# Patient Record
Sex: FEMALE | Race: WHITE | NOT HISPANIC OR LATINO
[De-identification: names, ages, dates, MRNs, and addresses within clinical notes are randomized per-mention and may not be internally consistent; named-entity substitution may affect disease eponyms.]

---

## 2017-11-30 ENCOUNTER — RX ONLY (RX ONLY)
Age: 47
End: 2017-11-30

## 2017-11-30 ENCOUNTER — DOCTOR'S OFFICE (OUTPATIENT)
Dept: URBAN - NONMETROPOLITAN AREA CLINIC 1 | Facility: CLINIC | Age: 47
Setting detail: OPHTHALMOLOGY
End: 2017-11-30
Payer: COMMERCIAL

## 2017-11-30 ENCOUNTER — OPTICAL OFFICE (OUTPATIENT)
Dept: URBAN - NONMETROPOLITAN AREA CLINIC 4 | Facility: CLINIC | Age: 47
Setting detail: OPHTHALMOLOGY
End: 2017-11-30
Payer: COMMERCIAL

## 2017-11-30 DIAGNOSIS — H52.03: ICD-10-CM

## 2017-11-30 DIAGNOSIS — H52.4: ICD-10-CM

## 2017-11-30 PROCEDURE — 92004 COMPRE OPH EXAM NEW PT 1/>: CPT | Performed by: OPTOMETRIST

## 2017-11-30 PROCEDURE — V2784 LENS POLYCARB OR EQUAL: HCPCS | Performed by: OPTOMETRIST

## 2017-11-30 PROCEDURE — 92015 DETERMINE REFRACTIVE STATE: CPT | Performed by: OPTOMETRIST

## 2017-11-30 PROCEDURE — V2202 LENS SPHERE BIFOCAL 7.12-20.: HCPCS | Performed by: OPTOMETRIST

## 2017-11-30 PROCEDURE — V2020 VISION SVCS FRAMES PURCHASES: HCPCS | Performed by: OPTOMETRIST

## 2017-11-30 ASSESSMENT — REFRACTION_OUTSIDERX
OS_ADD: +1.50
OS_VA2: 20/20
OD_SPHERE: +0.50
OD_AXIS: 150
OS_VA1: 20/20
OD_CYLINDER: -0.50
OS_VA3: 20/
OS_SPHERE: +0.50
OD_VA1: 20/20
OD_VA3: 20/
OD_VA2: 20/25
OU_VA: 20/20
OD_ADD: +1.50

## 2017-11-30 ASSESSMENT — REFRACTION_MANIFEST
OD_VA3: 20/
OD_VA3: 20/
OU_VA: 20/
OD_VA2: 20/
OD_VA1: 20/
OS_VA3: 20/
OD_VA1: 20/
OS_VA2: 20/
OS_VA3: 20/
OD_VA2: 20/
OS_VA2: 20/
OS_VA1: 20/
OS_VA1: 20/
OU_VA: 20/

## 2017-11-30 ASSESSMENT — REFRACTION_CURRENTRX
OS_OVR_VA: 20/
OD_OVR_VA: 20/
OD_CYLINDER: -0.25
OD_VPRISM_DIRECTION: BF
OS_ADD: +1.75
OS_SPHERE: PL
OD_AXIS: 008
OS_OVR_VA: 20/
OD_OVR_VA: 20/
OD_ADD: +1.75
OS_OVR_VA: 20/
OD_OVR_VA: 20/
OS_VPRISM_DIRECTION: BF
OD_SPHERE: PL

## 2017-11-30 ASSESSMENT — REFRACTION_AUTOREFRACTION
OS_AXIS: 004
OS_SPHERE: +1.00
OD_AXIS: 151
OS_CYLINDER: -0.75
OD_SPHERE: +0.75
OD_CYLINDER: -0.50

## 2017-11-30 ASSESSMENT — VISUAL ACUITY
OD_BCVA: 20/25-1
OS_BCVA: 20/25-2

## 2017-11-30 ASSESSMENT — SPHEQUIV_DERIVED
OD_SPHEQUIV: 0.5
OS_SPHEQUIV: 0.625

## 2017-11-30 ASSESSMENT — CONFRONTATIONAL VISUAL FIELD TEST (CVF)
OS_FINDINGS: FULL
OD_FINDINGS: FULL

## 2018-03-13 ENCOUNTER — OPTICAL OFFICE (OUTPATIENT)
Dept: URBAN - NONMETROPOLITAN AREA CLINIC 4 | Facility: CLINIC | Age: 48
Setting detail: OPHTHALMOLOGY
End: 2018-03-13
Payer: COMMERCIAL

## 2018-03-13 DIAGNOSIS — H52.03: ICD-10-CM

## 2018-03-13 PROCEDURE — V2784 LENS POLYCARB OR EQUAL: HCPCS | Performed by: OPTOMETRIST

## 2018-03-13 PROCEDURE — V2202 LENS SPHERE BIFOCAL 7.12-20.: HCPCS | Performed by: OPTOMETRIST

## 2018-03-13 PROCEDURE — V2020 VISION SVCS FRAMES PURCHASES: HCPCS | Performed by: OPTOMETRIST

## 2018-07-27 ENCOUNTER — OPTICAL OFFICE (OUTPATIENT)
Dept: URBAN - NONMETROPOLITAN AREA CLINIC 4 | Facility: CLINIC | Age: 48
Setting detail: OPHTHALMOLOGY
End: 2018-07-27
Payer: COMMERCIAL

## 2018-07-27 DIAGNOSIS — H52.221: ICD-10-CM

## 2018-07-27 PROCEDURE — V2784 LENS POLYCARB OR EQUAL: HCPCS | Performed by: OPTOMETRIST

## 2018-07-27 PROCEDURE — V2200 LENS SPHER BIFOC PLANO 4.00D: HCPCS | Performed by: OPTOMETRIST

## 2018-07-27 PROCEDURE — V2020 VISION SVCS FRAMES PURCHASES: HCPCS | Performed by: OPTOMETRIST

## 2018-07-27 PROCEDURE — V2202 LENS SPHERE BIFOCAL 7.12-20.: HCPCS | Performed by: OPTOMETRIST

## 2019-06-06 ENCOUNTER — DOCTOR'S OFFICE (OUTPATIENT)
Dept: URBAN - NONMETROPOLITAN AREA CLINIC 1 | Facility: CLINIC | Age: 49
Setting detail: OPHTHALMOLOGY
End: 2019-06-06
Payer: COMMERCIAL

## 2019-06-06 ENCOUNTER — OPTICAL OFFICE (OUTPATIENT)
Dept: URBAN - NONMETROPOLITAN AREA CLINIC 4 | Facility: CLINIC | Age: 49
Setting detail: OPHTHALMOLOGY
End: 2019-06-06
Payer: COMMERCIAL

## 2019-06-06 DIAGNOSIS — H52.03: ICD-10-CM

## 2019-06-06 DIAGNOSIS — H52.223: ICD-10-CM

## 2019-06-06 PROCEDURE — V2202 LENS SPHERE BIFOCAL 7.12-20.: HCPCS | Performed by: OPTOMETRIST

## 2019-06-06 PROCEDURE — V2784 LENS POLYCARB OR EQUAL: HCPCS | Performed by: OPTOMETRIST

## 2019-06-06 PROCEDURE — 92014 COMPRE OPH EXAM EST PT 1/>: CPT | Performed by: OPTOMETRIST

## 2019-06-06 PROCEDURE — V2203 LENS SPHCYL BIFOCAL 4.00D/.1: HCPCS | Performed by: OPTOMETRIST

## 2019-06-06 PROCEDURE — V2020 VISION SVCS FRAMES PURCHASES: HCPCS | Performed by: OPTOMETRIST

## 2019-06-06 ASSESSMENT — REFRACTION_MANIFEST
OS_ADD: +1.50
OU_VA: 20/20
OD_VA2: 20/
OD_VA1: 20/20
OU_VA: 20/
OS_VA1: 20/20
OD_SPHERE: +0.50
OS_VA3: 20/
OS_VA2: 20/20
OD_VA1: 20/20
OD_CYLINDER: -0.75
OS_VA2: 20/
OD_VA3: 20/
OD_AXIS: 170
OS_SPHERE: +0.50
OD_VA3: 20/
OD_CYLINDER: -0.50
OS_VA1: 20/20
OS_VA3: 20/
OD_ADD: +2.00
OS_AXIS: 15
OD_ADD: +1.50
OD_VA2: 20/25
OS_SPHERE: +0.75
OS_CYLINDER: -0.25
OD_AXIS: 150
OD_SPHERE: +1.00
OS_ADD: +2.00

## 2019-06-06 ASSESSMENT — SPHEQUIV_DERIVED
OD_SPHEQUIV: 0.25
OD_SPHEQUIV: 0.625
OS_SPHEQUIV: 0.75
OS_SPHEQUIV: 0.625
OD_SPHEQUIV: 0.625

## 2019-06-06 ASSESSMENT — REFRACTION_CURRENTRX
OD_OVR_VA: 20/
OS_AXIS: 180
OD_ADD: +1.75
OD_OVR_VA: 20/
OD_SPHERE: +0.50
OS_OVR_VA: 20/
OD_VPRISM_DIRECTION: BF
OS_CYLINDER: 0.00
OD_CYLINDER: -0.50
OD_AXIS: 151
OS_OVR_VA: 20/
OS_VPRISM_DIRECTION: BF
OD_OVR_VA: 20/
OS_OVR_VA: 20/
OS_SPHERE: +0.50
OS_ADD: +1.75

## 2019-06-06 ASSESSMENT — REFRACTION_AUTOREFRACTION
OD_AXIS: 149
OD_CYLINDER: -0.75
OD_SPHERE: +1.00
OS_CYLINDER: -0.50
OS_SPHERE: +1.00
OS_AXIS: 180

## 2019-06-06 ASSESSMENT — CONFRONTATIONAL VISUAL FIELD TEST (CVF)
OD_FINDINGS: FULL
OS_FINDINGS: FULL

## 2019-06-06 ASSESSMENT — VISUAL ACUITY
OS_BCVA: 20/25-1
OD_BCVA: 20/25

## 2019-10-21 ENCOUNTER — OPTICAL OFFICE (OUTPATIENT)
Dept: URBAN - NONMETROPOLITAN AREA CLINIC 4 | Facility: CLINIC | Age: 49
Setting detail: OPHTHALMOLOGY
End: 2019-10-21
Payer: COMMERCIAL

## 2019-10-21 DIAGNOSIS — H52.223: ICD-10-CM

## 2019-10-21 PROCEDURE — V2020 VISION SVCS FRAMES PURCHASES: HCPCS | Performed by: OPTOMETRIST

## 2019-10-21 PROCEDURE — V2784 LENS POLYCARB OR EQUAL: HCPCS | Performed by: OPTOMETRIST

## 2019-10-21 PROCEDURE — V2203 LENS SPHCYL BIFOCAL 4.00D/.1: HCPCS | Performed by: OPTOMETRIST

## 2019-10-21 PROCEDURE — V2202 LENS SPHERE BIFOCAL 7.12-20.: HCPCS | Performed by: OPTOMETRIST

## 2020-08-12 ENCOUNTER — OPTICAL OFFICE (OUTPATIENT)
Dept: URBAN - NONMETROPOLITAN AREA CLINIC 4 | Facility: CLINIC | Age: 50
Setting detail: OPHTHALMOLOGY
End: 2020-08-12
Payer: COMMERCIAL

## 2020-08-12 ENCOUNTER — DOCTOR'S OFFICE (OUTPATIENT)
Dept: URBAN - NONMETROPOLITAN AREA CLINIC 1 | Facility: CLINIC | Age: 50
Setting detail: OPHTHALMOLOGY
End: 2020-08-12
Payer: COMMERCIAL

## 2020-08-12 VITALS — HEIGHT: 55 IN

## 2020-08-12 DIAGNOSIS — H52.4: ICD-10-CM

## 2020-08-12 DIAGNOSIS — H52.03: ICD-10-CM

## 2020-08-12 PROCEDURE — V2020 VISION SVCS FRAMES PURCHASES: HCPCS | Performed by: OPTOMETRIST

## 2020-08-12 PROCEDURE — V2202 LENS SPHERE BIFOCAL 7.12-20.: HCPCS | Performed by: OPTOMETRIST

## 2020-08-12 PROCEDURE — V2200 LENS SPHER BIFOC PLANO 4.00D: HCPCS | Performed by: OPTOMETRIST

## 2020-08-12 PROCEDURE — 92015 DETERMINE REFRACTIVE STATE: CPT | Performed by: OPTOMETRIST

## 2020-08-12 PROCEDURE — 92014 COMPRE OPH EXAM EST PT 1/>: CPT | Performed by: OPTOMETRIST

## 2020-08-12 PROCEDURE — V2784 LENS POLYCARB OR EQUAL: HCPCS | Performed by: OPTOMETRIST

## 2020-08-12 ASSESSMENT — SPHEQUIV_DERIVED
OS_SPHEQUIV: 1
OD_SPHEQUIV: 0.5
OD_SPHEQUIV: 0.625
OS_SPHEQUIV: 0.625
OD_SPHEQUIV: 0.625

## 2020-08-12 ASSESSMENT — REFRACTION_CURRENTRX
OS_SPHERE: +0.75
OS_VPRISM_DIRECTION: BF
OD_SPHERE: +1.00
OD_ADD: +2.25
OS_CYLINDER: -0.25
OS_AXIS: 8
OS_ADD: +2.25
OD_CYLINDER: -0.75
OD_OVR_VA: 20/
OS_OVR_VA: 20/
OD_VPRISM_DIRECTION: BF
OD_AXIS: 174

## 2020-08-12 ASSESSMENT — REFRACTION_AUTOREFRACTION
OS_SPHERE: +1.00
OD_AXIS: 169
OD_CYLINDER: -0.25
OS_AXIS: 180
OS_CYLINDER: 0.00
OD_SPHERE: +0.75

## 2020-08-12 ASSESSMENT — REFRACTION_MANIFEST
OD_VA2: 20/20
OD_VA1: 20/20
OD_VA1: 20/20
OS_SPHERE: +0.75
OU_VA: 20/20
OS_CYLINDER: -0.25
OD_SPHERE: +0.75
OS_VA2: 20/20
OS_VA1: 20/20
OD_SPHERE: +1.00
OD_AXIS: 170
OS_AXIS: 15
OD_AXIS: 170
OD_ADD: +2.00
OS_VA1: 20/20
OS_CYLINDER: SPH
OS_ADD: +2.00
OS_ADD: +2.00
OD_CYLINDER: -0.75
OD_CYLINDER: -0.50
OD_ADD: +2.00
OS_SPHERE: +0.75

## 2020-08-12 ASSESSMENT — CONFRONTATIONAL VISUAL FIELD TEST (CVF)
OS_FINDINGS: FULL
OD_FINDINGS: FULL

## 2020-08-12 ASSESSMENT — VISUAL ACUITY
OD_BCVA: 20/20-2
OS_BCVA: 20/20-1

## 2020-08-26 ENCOUNTER — OPTICAL OFFICE (OUTPATIENT)
Dept: URBAN - NONMETROPOLITAN AREA CLINIC 4 | Facility: CLINIC | Age: 50
Setting detail: OPHTHALMOLOGY
End: 2020-08-26
Payer: COMMERCIAL

## 2020-08-26 DIAGNOSIS — H52.03: ICD-10-CM

## 2020-08-26 PROCEDURE — V2020 VISION SVCS FRAMES PURCHASES: HCPCS | Performed by: OPTOMETRIST

## 2020-08-26 PROCEDURE — V2200 LENS SPHER BIFOC PLANO 4.00D: HCPCS | Performed by: OPTOMETRIST

## 2020-08-26 PROCEDURE — V2202 LENS SPHERE BIFOCAL 7.12-20.: HCPCS | Performed by: OPTOMETRIST

## 2020-08-26 PROCEDURE — V2784 LENS POLYCARB OR EQUAL: HCPCS | Performed by: OPTOMETRIST

## 2020-10-21 ENCOUNTER — HOSPITAL ENCOUNTER (OUTPATIENT)
Dept: RADIOLOGY | Facility: HOSPITAL | Age: 50
Discharge: HOME | End: 2020-10-21
Attending: RADIOLOGY

## 2020-10-21 DIAGNOSIS — Z76.89 REFERRAL OF PATIENT WITHOUT EXAMINATION OR TREATMENT: ICD-10-CM

## 2020-10-22 ENCOUNTER — CONSULT (OUTPATIENT)
Dept: PAIN MEDICINE | Facility: CLINIC | Age: 50
End: 2020-10-22
Payer: COMMERCIAL

## 2020-10-22 VITALS
DIASTOLIC BLOOD PRESSURE: 80 MMHG | BODY MASS INDEX: 35.99 KG/M2 | WEIGHT: 216 LBS | TEMPERATURE: 97.7 F | SYSTOLIC BLOOD PRESSURE: 128 MMHG | HEART RATE: 109 BPM | HEIGHT: 65 IN

## 2020-10-22 DIAGNOSIS — M54.16 LUMBAR RADICULOPATHY: Primary | ICD-10-CM

## 2020-10-22 DIAGNOSIS — M53.3 SACROILIAC JOINT DYSFUNCTION OF RIGHT SIDE: ICD-10-CM

## 2020-10-22 PROCEDURE — 99244 OFF/OP CNSLTJ NEW/EST MOD 40: CPT | Performed by: ANESTHESIOLOGY

## 2020-10-22 RX ORDER — CYCLOBENZAPRINE HCL 10 MG
TABLET ORAL
COMMUNITY
Start: 2020-10-05 | End: 2020-10-22

## 2020-10-22 RX ORDER — CELECOXIB 200 MG/1
200 CAPSULE ORAL 2 TIMES DAILY
Qty: 60 CAPSULE | Refills: 0 | Status: SHIPPED | OUTPATIENT
Start: 2020-10-22

## 2020-10-22 RX ORDER — IBUPROFEN 800 MG/1
TABLET ORAL
COMMUNITY
Start: 2020-10-05 | End: 2020-10-22

## 2020-10-22 RX ORDER — GABAPENTIN 100 MG/1
100 CAPSULE ORAL 3 TIMES DAILY
Qty: 90 CAPSULE | Refills: 0 | Status: SHIPPED | OUTPATIENT
Start: 2020-10-22

## 2020-10-29 ENCOUNTER — EVALUATION (OUTPATIENT)
Dept: PHYSICAL THERAPY | Facility: CLINIC | Age: 50
End: 2020-10-29
Payer: COMMERCIAL

## 2020-10-29 DIAGNOSIS — M54.16 LUMBAR RADICULOPATHY: ICD-10-CM

## 2020-10-29 PROCEDURE — 97162 PT EVAL MOD COMPLEX 30 MIN: CPT | Performed by: PHYSICAL THERAPIST

## 2020-10-29 PROCEDURE — 97535 SELF CARE MNGMENT TRAINING: CPT | Performed by: PHYSICAL THERAPIST

## 2020-11-05 ENCOUNTER — OFFICE VISIT (OUTPATIENT)
Dept: PHYSICAL THERAPY | Facility: CLINIC | Age: 50
End: 2020-11-05
Payer: COMMERCIAL

## 2020-11-05 DIAGNOSIS — M54.16 LUMBAR RADICULOPATHY: Primary | ICD-10-CM

## 2020-11-05 PROCEDURE — 97140 MANUAL THERAPY 1/> REGIONS: CPT | Performed by: PHYSICAL THERAPIST

## 2020-11-05 PROCEDURE — 97112 NEUROMUSCULAR REEDUCATION: CPT | Performed by: PHYSICAL THERAPIST

## 2020-11-13 ENCOUNTER — OFFICE VISIT (OUTPATIENT)
Dept: PHYSICAL THERAPY | Facility: CLINIC | Age: 50
End: 2020-11-13
Payer: COMMERCIAL

## 2020-11-13 DIAGNOSIS — M54.16 LUMBAR RADICULOPATHY: Primary | ICD-10-CM

## 2020-11-13 PROCEDURE — 97140 MANUAL THERAPY 1/> REGIONS: CPT | Performed by: PHYSICAL THERAPIST

## 2020-11-13 PROCEDURE — 97112 NEUROMUSCULAR REEDUCATION: CPT | Performed by: PHYSICAL THERAPIST

## 2021-01-20 ENCOUNTER — APPOINTMENT (EMERGENCY)
Dept: CT IMAGING | Facility: HOSPITAL | Age: 51
End: 2021-01-20
Payer: COMMERCIAL

## 2021-01-20 ENCOUNTER — APPOINTMENT (EMERGENCY)
Dept: RADIOLOGY | Facility: HOSPITAL | Age: 51
End: 2021-01-20
Payer: COMMERCIAL

## 2021-01-20 ENCOUNTER — HOSPITAL ENCOUNTER (EMERGENCY)
Facility: HOSPITAL | Age: 51
Discharge: HOME | End: 2021-01-21
Attending: EMERGENCY MEDICINE | Admitting: EMERGENCY MEDICINE
Payer: COMMERCIAL

## 2021-01-20 DIAGNOSIS — R42 DIZZINESS: Primary | ICD-10-CM

## 2021-01-20 LAB
ANION GAP SERPL CALCULATED.3IONS-SCNC: 12 MMOL/L (ref 4–13)
BASOPHILS # BLD AUTO: 0.1 THOUSANDS/ΜL (ref 0–0.1)
BASOPHILS NFR BLD AUTO: 1 % (ref 0–1)
BUN SERPL-MCNC: 21 MG/DL (ref 5–25)
CALCIUM SERPL-MCNC: 9.7 MG/DL (ref 8.3–10.1)
CHLORIDE SERPL-SCNC: 101 MMOL/L (ref 100–108)
CO2 SERPL-SCNC: 26 MMOL/L (ref 21–32)
CREAT SERPL-MCNC: 0.83 MG/DL (ref 0.6–1.3)
D DIMER PPP FEU-MCNC: 0.56 UG/ML FEU
EOSINOPHIL # BLD AUTO: 0.21 THOUSAND/ΜL (ref 0–0.61)
EOSINOPHIL NFR BLD AUTO: 2 % (ref 0–6)
ERYTHROCYTE [DISTWIDTH] IN BLOOD BY AUTOMATED COUNT: 17.2 % (ref 11.6–15.1)
GFR SERPL CREATININE-BSD FRML MDRD: 84 ML/MIN/1.73SQ M
GLUCOSE SERPL-MCNC: 125 MG/DL (ref 65–140)
HCT VFR BLD AUTO: 43.5 % (ref 34.8–46.1)
HGB BLD-MCNC: 13.9 G/DL (ref 11.5–15.4)
IMM GRANULOCYTES # BLD AUTO: 0.04 THOUSAND/UL (ref 0–0.2)
IMM GRANULOCYTES NFR BLD AUTO: 0 % (ref 0–2)
LYMPHOCYTES # BLD AUTO: 4.86 THOUSANDS/ΜL (ref 0.6–4.47)
LYMPHOCYTES NFR BLD AUTO: 38 % (ref 14–44)
MCH RBC QN AUTO: 25.2 PG (ref 26.8–34.3)
MCHC RBC AUTO-ENTMCNC: 32 G/DL (ref 31.4–37.4)
MCV RBC AUTO: 79 FL (ref 82–98)
MONOCYTES # BLD AUTO: 0.91 THOUSAND/ΜL (ref 0.17–1.22)
MONOCYTES NFR BLD AUTO: 7 % (ref 4–12)
NEUTROPHILS # BLD AUTO: 6.74 THOUSANDS/ΜL (ref 1.85–7.62)
NEUTS SEG NFR BLD AUTO: 52 % (ref 43–75)
NRBC BLD AUTO-RTO: 0 /100 WBCS
NT-PROBNP SERPL-MCNC: 6 PG/ML
PLATELET # BLD AUTO: 369 THOUSANDS/UL (ref 149–390)
PMV BLD AUTO: 9.2 FL (ref 8.9–12.7)
POTASSIUM SERPL-SCNC: 3.7 MMOL/L (ref 3.5–5.3)
RBC # BLD AUTO: 5.51 MILLION/UL (ref 3.81–5.12)
SODIUM SERPL-SCNC: 139 MMOL/L (ref 136–145)
TROPONIN I SERPL-MCNC: <0.02 NG/ML
WBC # BLD AUTO: 12.86 THOUSAND/UL (ref 4.31–10.16)

## 2021-01-20 PROCEDURE — 96374 THER/PROPH/DIAG INJ IV PUSH: CPT

## 2021-01-20 PROCEDURE — G1004 CDSM NDSC: HCPCS

## 2021-01-20 PROCEDURE — 84484 ASSAY OF TROPONIN QUANT: CPT | Performed by: EMERGENCY MEDICINE

## 2021-01-20 PROCEDURE — 80048 BASIC METABOLIC PNL TOTAL CA: CPT | Performed by: EMERGENCY MEDICINE

## 2021-01-20 PROCEDURE — 71275 CT ANGIOGRAPHY CHEST: CPT

## 2021-01-20 PROCEDURE — 85025 COMPLETE CBC W/AUTO DIFF WBC: CPT | Performed by: EMERGENCY MEDICINE

## 2021-01-20 PROCEDURE — 99285 EMERGENCY DEPT VISIT HI MDM: CPT

## 2021-01-20 PROCEDURE — 96361 HYDRATE IV INFUSION ADD-ON: CPT

## 2021-01-20 PROCEDURE — 70450 CT HEAD/BRAIN W/O DYE: CPT

## 2021-01-20 PROCEDURE — 93005 ELECTROCARDIOGRAM TRACING: CPT

## 2021-01-20 PROCEDURE — 71045 X-RAY EXAM CHEST 1 VIEW: CPT

## 2021-01-20 PROCEDURE — 81003 URINALYSIS AUTO W/O SCOPE: CPT | Performed by: EMERGENCY MEDICINE

## 2021-01-20 PROCEDURE — 36415 COLL VENOUS BLD VENIPUNCTURE: CPT | Performed by: EMERGENCY MEDICINE

## 2021-01-20 PROCEDURE — 83880 ASSAY OF NATRIURETIC PEPTIDE: CPT | Performed by: EMERGENCY MEDICINE

## 2021-01-20 PROCEDURE — 85379 FIBRIN DEGRADATION QUANT: CPT | Performed by: EMERGENCY MEDICINE

## 2021-01-20 RX ORDER — SODIUM CHLORIDE 9 MG/ML
3 INJECTION INTRAVENOUS
Status: DISCONTINUED | OUTPATIENT
Start: 2021-01-20 | End: 2021-01-21 | Stop reason: HOSPADM

## 2021-01-20 RX ORDER — BUSPIRONE HYDROCHLORIDE 10 MG/1
10 TABLET ORAL 2 TIMES DAILY
COMMUNITY

## 2021-01-20 RX ORDER — DIAZEPAM 5 MG/ML
5 INJECTION, SOLUTION INTRAMUSCULAR; INTRAVENOUS ONCE
Status: DISCONTINUED | OUTPATIENT
Start: 2021-01-20 | End: 2021-01-20

## 2021-01-20 RX ORDER — LORAZEPAM 2 MG/ML
1 INJECTION INTRAMUSCULAR ONCE
Status: COMPLETED | OUTPATIENT
Start: 2021-01-20 | End: 2021-01-20

## 2021-01-20 RX ADMIN — IOHEXOL 85 ML: 350 INJECTION, SOLUTION INTRAVENOUS at 23:45

## 2021-01-20 RX ADMIN — SODIUM CHLORIDE 1000 ML: 0.9 INJECTION, SOLUTION INTRAVENOUS at 23:15

## 2021-01-20 RX ADMIN — LORAZEPAM 1 MG: 2 INJECTION INTRAMUSCULAR; INTRAVENOUS at 23:15

## 2021-01-21 VITALS
SYSTOLIC BLOOD PRESSURE: 138 MMHG | OXYGEN SATURATION: 97 % | WEIGHT: 222.66 LBS | RESPIRATION RATE: 18 BRPM | HEART RATE: 93 BPM | BODY MASS INDEX: 37.05 KG/M2 | TEMPERATURE: 96.6 F | DIASTOLIC BLOOD PRESSURE: 87 MMHG

## 2021-01-21 LAB
BILIRUB UR QL STRIP: NEGATIVE
CLARITY UR: CLEAR
COLOR UR: YELLOW
GLUCOSE UR STRIP-MCNC: NEGATIVE MG/DL
HGB UR QL STRIP.AUTO: NEGATIVE
KETONES UR STRIP-MCNC: NEGATIVE MG/DL
LEUKOCYTE ESTERASE UR QL STRIP: NEGATIVE
NITRITE UR QL STRIP: NEGATIVE
PH UR STRIP.AUTO: 6.5 [PH]
PROT UR STRIP-MCNC: NEGATIVE MG/DL
SP GR UR STRIP.AUTO: 1.02 (ref 1–1.03)
UROBILINOGEN UR QL STRIP.AUTO: 0.2 E.U./DL

## 2021-01-21 PROCEDURE — 99284 EMERGENCY DEPT VISIT MOD MDM: CPT | Performed by: EMERGENCY MEDICINE

## 2021-01-21 NOTE — ED PROVIDER NOTES
History  Chief Complaint   Patient presents with    Dizziness     Patient started feeling dizzy and lightheaded since this morning  Patient recently started taking a new medication  65-year-old female with recent positive for COVID presents ED with lightheadedness x1 day  Dizziness  Quality:  Lightheadedness  Severity:  Mild  Onset quality:  Sudden  Timing:  Intermittent  Progression:  Worsening  Chronicity:  New  Context: medication    Relieved by:  None tried  Worsened by:  Nothing  Ineffective treatments:  None tried  Associated symptoms: palpitations and shortness of breath        Prior to Admission Medications   Prescriptions Last Dose Informant Patient Reported? Taking?   busPIRone (BUSPAR) 10 mg tablet 1/20/2021 at Unknown time  Yes Yes   Sig: Take 10 mg by mouth 2 (two) times a day   celecoxib (CeleBREX) 200 mg capsule Not Taking at Unknown time  No No   Sig: Take 1 capsule (200 mg total) by mouth 2 (two) times a day   Patient not taking: Reported on 1/20/2021   gabapentin (NEURONTIN) 100 mg capsule More than a month at Unknown time  No No   Sig: Take 1 capsule (100 mg total) by mouth 3 (three) times a day      Facility-Administered Medications: None       Past Medical History:   Diagnosis Date    Arthritis     Asthma     Depression     Thyroid disease     Uterine cancer ([facility] [address] )        Past Surgical History:   Procedure Laterality Date    HYSTERECTOMY  09/2019    KNEE SURGERY         Family History   Problem Relation Age of Onset    Lung cancer Mother     Heart attack Father     Diabetes Father     Diabetes Maternal Grandmother      I have reviewed and agree with the history as documented      E-Cigarette/Vaping    E-Cigarette Use Never User      E-Cigarette/Vaping Substances     Social History     Tobacco Use    Smoking status: Never Smoker    Smokeless tobacco: Never Used   Substance Use Topics    Alcohol use: Never     Frequency: Never    Drug use: Never       Review of Systems Constitutional: Negative for fatigue  Respiratory: Positive for shortness of breath  Cardiovascular: Positive for palpitations  Gastrointestinal: Negative for abdominal pain  Neurological: Positive for dizziness  All other systems reviewed and are negative  Physical Exam  Physical Exam  Vitals signs and nursing note reviewed  Constitutional:       General: She is not in acute distress  Appearance: She is well-developed  HENT:      Head: Normocephalic and atraumatic  Right Ear: External ear normal       Left Ear: External ear normal    Eyes:      Conjunctiva/sclera: Conjunctivae normal       Pupils: Pupils are equal, round, and reactive to light  Neck:      Musculoskeletal: Normal range of motion and neck supple  Cardiovascular:      Rate and Rhythm: Normal rate and regular rhythm  Heart sounds: Normal heart sounds  No murmur  Pulmonary:      Effort: Pulmonary effort is normal       Breath sounds: Normal breath sounds  No wheezing or rales  Abdominal:      General: Bowel sounds are normal  There is no distension  Palpations: Abdomen is soft  Tenderness: There is no abdominal tenderness  There is no guarding or rebound  Musculoskeletal: Normal range of motion  General: No deformity  Skin:     General: Skin is warm and dry  Findings: No rash  Neurological:      General: No focal deficit present  Mental Status: She is alert and oriented to person, place, and time  Cranial Nerves: No cranial nerve deficit     Psychiatric:         Behavior: Behavior normal          Vital Signs  ED Triage Vitals [01/20/21 2248]   Temperature Pulse Respirations Blood Pressure SpO2   (!) 96 6 °F (35 9 °C) (!) 110 20 (!) 191/114 99 %      Temp Source Heart Rate Source Patient Position - Orthostatic VS BP Location FiO2 (%)   Temporal Monitor Lying Right arm --      Pain Score       --           Vitals:    01/20/21 2248 01/20/21 2317 01/20/21 2330 01/21/21 0030 BP: (!) 191/114 141/91 134/79 138/87   Pulse: (!) 110 99 93 93   Patient Position - Orthostatic VS: Lying Lying Lying          Visual Acuity  Visual Acuity      Most Recent Value   L Pupil Size (mm)  3   R Pupil Size (mm)  3          ED Medications  Medications   sodium chloride 0 9 % bolus 1,000 mL (0 mL Intravenous Stopped 1/21/21 0034)   LORazepam (ATIVAN) injection 1 mg (1 mg Intravenous Given 1/20/21 2315)   iohexol (OMNIPAQUE) 350 MG/ML injection (SINGLE-DOSE) 85 mL (85 mL Intravenous Given 1/20/21 2345)       Diagnostic Studies  Results Reviewed     Procedure Component Value Units Date/Time    UA (URINE) with reflex to Scope [[id number]] Collected: 01/20/21 2358    Lab Status: Final result Specimen: Urine, Clean Catch Updated: 01/21/21 0009     Color, UA Yellow     Clarity, UA Clear     Specific Reading, UA 1 020     pH, UA 6 5     Leukocytes, UA Negative     Nitrite, UA Negative     Protein, UA Negative mg/dl      Glucose, UA Negative mg/dl      Ketones, UA Negative mg/dl      Urobilinogen, UA 0 2 E U /dl      Bilirubin, UA Negative     Blood, UA Negative    Basic metabolic panel [[id number]] Collected: 01/20/21 2257    Lab Status: Final result Specimen: Blood from Arm, Right Updated: 01/20/21 2329     Sodium 139 mmol/L      Potassium 3 7 mmol/L      Chloride 101 mmol/L      CO2 26 mmol/L      ANION GAP 12 mmol/L      BUN 21 mg/dL      Creatinine 0 83 mg/dL      Glucose 125 mg/dL      Calcium 9 7 mg/dL      eGFR 84 ml/min/1 73sq m     Narrative:      [facility] guidelines for Chronic Kidney Disease (CKD):     Stage 1 with normal or high GFR (GFR > 90 mL/min/1 73 square meters)    Stage 2 Mild CKD (GFR = 60-89 mL/min/1 73 square meters)    Stage 3A Moderate CKD (GFR = 45-59 mL/min/1 73 square meters)    Stage 3B Moderate CKD (GFR = 30-44 mL/min/1 73 square meters)    Stage 4 Severe CKD (GFR = 15-29 mL/min/1 73 square meters)    Stage 5 End Stage CKD (GFR <15 mL/min/1 73 square meters)  Note: GFR calculation is accurate only with a steady state creatinine    NT-BNP PRO [[id number]]  (Normal) Collected: 01/20/21 2257    Lab Status: Final result Specimen: Blood from Arm, Right Updated: 01/20/21 2329     NT-proBNP 6 pg/mL     Troponin I [[id number]]  (Normal) Collected: 01/20/21 2257    Lab Status: Final result Specimen: Blood from Arm, Right Updated: 01/20/21 2326     Troponin I <0 02 ng/mL     D-dimer, quantitative [[id number]]  (Abnormal) Collected: 01/20/21 2257    Lab Status: Final result Specimen: Blood from Arm, Right Updated: 01/20/21 2323     D-Dimer, Quant 0 56 ug/ml FEU     CBC and differential [[id number]]  (Abnormal) Collected: 01/20/21 2257    Lab Status: Final result Specimen: Blood from Arm, Right Updated: 01/20/21 2310     WBC 12 86 Thousand/uL      RBC 5 51 Million/uL      Hemoglobin 13 9 g/dL      Hematocrit 43 5 %      MCV 79 fL      MCH 25 2 pg      MCHC 32 0 g/dL      RDW 17 2 %      MPV 9 2 fL      Platelets 708 Thousands/uL      nRBC 0 /100 WBCs      Neutrophils Relative 52 %      Immat GRANS % 0 %      Lymphocytes Relative 38 %      Monocytes Relative 7 %      Eosinophils Relative 2 %      Basophils Relative 1 %      Neutrophils Absolute 6 74 Thousands/µL      Immature Grans Absolute 0 04 Thousand/uL      Lymphocytes Absolute 4 86 Thousands/µL      Monocytes Absolute 0 91 Thousand/µL      Eosinophils Absolute 0 21 Thousand/µL      Basophils Absolute 0 10 Thousands/µL                  CTA ED chest PE Study   Final Result by Jorge Sosa MD (01/21 0038)      1  No evidence of pulmonary embolism  2   Mild emphysematous changes of lungs  3   Minimal thickening of the bronchi and opacification of scattered peripheral bronchi which may be due to bronchitis/bronchiolitis  Workstation performed: [id number]         CT head without contrast   Final Result by Amber Batista MD (01/21 0017)      No acute intracranial abnormality                    Workstation performed: [id number]         XR chest 1 view    (Results Pending)              Procedures  Procedures         ED Course  ED Course as of Jan 21 0606   Wed Jan 20, 2021   2334 Patient with elevated D-dimer, recent COVID, will CTA chest       Thu Jan 21, 2021   0041 Patient feeling better  Patient is afebrile, vitals stable, nontoxic appearing, counseled patient extensively signs symptoms return and follow family doctor  Patient agreeable with plan  SBIRT 22yo+      Most Recent Value   SBIRT (24 yo +)   In order to provide better care to our patients, we are screening all of our patients for alcohol and drug use  Would it be okay to ask you these screening questions? Yes Filed at: 01/20/2021 2330   Initial Alcohol Screen: US AUDIT-C    1  How often do you have a drink containing alcohol?  0 Filed at: 01/20/2021 2330   2  How many drinks containing alcohol do you have on a typical day you are drinking? 0 Filed at: 01/20/2021 2330   3a  Male UNDER 65: How often do you have five or more drinks on one occasion? 0 Filed at: 01/20/2021 2330   3b  FEMALE Any Age, or MALE 65+: How often do you have 4 or more drinks on one occassion? 0 Filed at: 01/20/2021 2330   Audit-C Score  0 Filed at: 01/20/2021 2330   FRANCA: How many times in the past year have you    Used an illegal drug or used a prescription medication for non-medical reasons?   Never Filed at: 01/20/2021 2330                    MDM  Number of Diagnoses or Management Options  Dizziness:   Patient Progress  Patient progress: improved      Disposition  Final diagnoses:   Dizziness     Time reflects when diagnosis was documented in both MDM as applicable and the Disposition within this note     Time User Action Codes Description Comment    1/21/2021 12:42 AM Patrick Davies Add [R42] Dizziness       ED Disposition     ED Disposition Condition Date/Time Comment    Discharge Stable Thu Jan 21, 2021 12:42 AM Marcial Trivedi discharge to home/self care  Follow-up Information     Follow up With Specialties Details Why Contact Info    Josy Lott MD Family Medicine Call in 1 day  [address] [phone]            Discharge Medication List as of 1/21/2021 12:44 AM      CONTINUE these medications which have NOT CHANGED    Details   busPIRone (BUSPAR) 10 mg tablet Take 10 mg by mouth 2 (two) times a day, Historical Med      celecoxib (CeleBREX) 200 mg capsule Take 1 capsule (200 mg total) by mouth 2 (two) times a day, Starting Thu 10/22/2020, Normal      gabapentin (NEURONTIN) 100 mg capsule Take 1 capsule (100 mg total) by mouth 3 (three) times a day, Starting Thu 10/22/2020, Normal           No discharge procedures on file      PDMP Review     None          ED Provider  Electronically Signed by           Mau Ellis DO  01/21/21 4603

## 2021-01-25 LAB
ATRIAL RATE: 104 BPM
P AXIS: 66 DEGREES
PR INTERVAL: 122 MS
QRS AXIS: 13 DEGREES
QRSD INTERVAL: 84 MS
QT INTERVAL: 334 MS
QTC INTERVAL: 439 MS
T WAVE AXIS: 67 DEGREES
VENTRICULAR RATE: 104 BPM

## 2021-01-25 PROCEDURE — 93010 ELECTROCARDIOGRAM REPORT: CPT | Performed by: INTERNAL MEDICINE

## 2021-02-04 ENCOUNTER — APPOINTMENT (EMERGENCY)
Dept: RADIOLOGY | Facility: HOSPITAL | Age: 51
End: 2021-02-04
Payer: COMMERCIAL

## 2021-02-04 ENCOUNTER — APPOINTMENT (EMERGENCY)
Dept: CT IMAGING | Facility: HOSPITAL | Age: 51
End: 2021-02-04
Payer: COMMERCIAL

## 2021-02-04 ENCOUNTER — HOSPITAL ENCOUNTER (EMERGENCY)
Facility: HOSPITAL | Age: 51
Discharge: HOME | End: 2021-02-04
Attending: EMERGENCY MEDICINE | Admitting: EMERGENCY MEDICINE
Payer: COMMERCIAL

## 2021-02-04 VITALS
WEIGHT: 225.31 LBS | RESPIRATION RATE: 22 BRPM | DIASTOLIC BLOOD PRESSURE: 72 MMHG | SYSTOLIC BLOOD PRESSURE: 126 MMHG | BODY MASS INDEX: 37.49 KG/M2 | HEART RATE: 82 BPM | TEMPERATURE: 97.5 F | OXYGEN SATURATION: 96 %

## 2021-02-04 DIAGNOSIS — R07.89 ATYPICAL CHEST PAIN: Primary | ICD-10-CM

## 2021-02-04 DIAGNOSIS — F41.9 ANXIETY: ICD-10-CM

## 2021-02-04 LAB
ALBUMIN SERPL BCP-MCNC: 3.4 G/DL (ref 3.5–5)
ALP SERPL-CCNC: 126 U/L (ref 46–116)
ALT SERPL W P-5'-P-CCNC: 40 U/L (ref 12–78)
ANION GAP SERPL CALCULATED.3IONS-SCNC: 10 MMOL/L (ref 4–13)
APTT PPP: 27 SECONDS (ref 23–37)
AST SERPL W P-5'-P-CCNC: 29 U/L (ref 5–45)
BASOPHILS # BLD AUTO: 0.06 THOUSANDS/ΜL (ref 0–0.1)
BASOPHILS NFR BLD AUTO: 1 % (ref 0–1)
BILIRUB SERPL-MCNC: 0.35 MG/DL (ref 0.2–1)
BUN SERPL-MCNC: 15 MG/DL (ref 5–25)
CALCIUM ALBUM COR SERPL-MCNC: 10.1 MG/DL (ref 8.3–10.1)
CALCIUM SERPL-MCNC: 9.6 MG/DL (ref 8.3–10.1)
CHLORIDE SERPL-SCNC: 103 MMOL/L (ref 100–108)
CO2 SERPL-SCNC: 28 MMOL/L (ref 21–32)
CREAT SERPL-MCNC: 0.82 MG/DL (ref 0.6–1.3)
CRP SERPL QL: <0.5 MG/L
D DIMER PPP FEU-MCNC: 0.5 UG/ML FEU
EOSINOPHIL # BLD AUTO: 0.19 THOUSAND/ΜL (ref 0–0.61)
EOSINOPHIL NFR BLD AUTO: 3 % (ref 0–6)
ERYTHROCYTE [DISTWIDTH] IN BLOOD BY AUTOMATED COUNT: 17.7 % (ref 11.6–15.1)
GFR SERPL CREATININE-BSD FRML MDRD: 85 ML/MIN/1.73SQ M
GLUCOSE SERPL-MCNC: 149 MG/DL (ref 65–140)
HCT VFR BLD AUTO: 42.1 % (ref 34.8–46.1)
HGB BLD-MCNC: 13.1 G/DL (ref 11.5–15.4)
IMM GRANULOCYTES # BLD AUTO: 0.01 THOUSAND/UL (ref 0–0.2)
IMM GRANULOCYTES NFR BLD AUTO: 0 % (ref 0–2)
INR PPP: 0.99 (ref 0.84–1.19)
LACTATE SERPL-SCNC: 0.8 MMOL/L (ref 0.5–2)
LIPASE SERPL-CCNC: 224 U/L (ref 73–393)
LYMPHOCYTES # BLD AUTO: 2.63 THOUSANDS/ΜL (ref 0.6–4.47)
LYMPHOCYTES NFR BLD AUTO: 34 % (ref 14–44)
MCH RBC QN AUTO: 24.4 PG (ref 26.8–34.3)
MCHC RBC AUTO-ENTMCNC: 31.1 G/DL (ref 31.4–37.4)
MCV RBC AUTO: 79 FL (ref 82–98)
MONOCYTES # BLD AUTO: 0.45 THOUSAND/ΜL (ref 0.17–1.22)
MONOCYTES NFR BLD AUTO: 6 % (ref 4–12)
NEUTROPHILS # BLD AUTO: 4.34 THOUSANDS/ΜL (ref 1.85–7.62)
NEUTS SEG NFR BLD AUTO: 56 % (ref 43–75)
NRBC BLD AUTO-RTO: 0 /100 WBCS
NT-PROBNP SERPL-MCNC: 13 PG/ML
PLATELET # BLD AUTO: 304 THOUSANDS/UL (ref 149–390)
PMV BLD AUTO: 9.3 FL (ref 8.9–12.7)
POTASSIUM SERPL-SCNC: 3.7 MMOL/L (ref 3.5–5.3)
PROT SERPL-MCNC: 8.5 G/DL (ref 6.4–8.2)
PROTHROMBIN TIME: 12.9 SECONDS (ref 11.6–14.5)
RBC # BLD AUTO: 5.36 MILLION/UL (ref 3.81–5.12)
SODIUM SERPL-SCNC: 141 MMOL/L (ref 136–145)
TROPONIN I SERPL-MCNC: <0.02 NG/ML
WBC # BLD AUTO: 7.68 THOUSAND/UL (ref 4.31–10.16)

## 2021-02-04 PROCEDURE — 85730 THROMBOPLASTIN TIME PARTIAL: CPT | Performed by: EMERGENCY MEDICINE

## 2021-02-04 PROCEDURE — 84484 ASSAY OF TROPONIN QUANT: CPT | Performed by: EMERGENCY MEDICINE

## 2021-02-04 PROCEDURE — G1004 CDSM NDSC: HCPCS

## 2021-02-04 PROCEDURE — 80053 COMPREHEN METABOLIC PANEL: CPT | Performed by: EMERGENCY MEDICINE

## 2021-02-04 PROCEDURE — 86140 C-REACTIVE PROTEIN: CPT | Performed by: EMERGENCY MEDICINE

## 2021-02-04 PROCEDURE — 36415 COLL VENOUS BLD VENIPUNCTURE: CPT | Performed by: EMERGENCY MEDICINE

## 2021-02-04 PROCEDURE — 83690 ASSAY OF LIPASE: CPT | Performed by: EMERGENCY MEDICINE

## 2021-02-04 PROCEDURE — 96361 HYDRATE IV INFUSION ADD-ON: CPT

## 2021-02-04 PROCEDURE — 71275 CT ANGIOGRAPHY CHEST: CPT

## 2021-02-04 PROCEDURE — 93005 ELECTROCARDIOGRAM TRACING: CPT

## 2021-02-04 PROCEDURE — 71045 X-RAY EXAM CHEST 1 VIEW: CPT

## 2021-02-04 PROCEDURE — 99285 EMERGENCY DEPT VISIT HI MDM: CPT

## 2021-02-04 PROCEDURE — 70450 CT HEAD/BRAIN W/O DYE: CPT

## 2021-02-04 PROCEDURE — 83605 ASSAY OF LACTIC ACID: CPT | Performed by: EMERGENCY MEDICINE

## 2021-02-04 PROCEDURE — 85025 COMPLETE CBC W/AUTO DIFF WBC: CPT | Performed by: EMERGENCY MEDICINE

## 2021-02-04 PROCEDURE — 85610 PROTHROMBIN TIME: CPT | Performed by: EMERGENCY MEDICINE

## 2021-02-04 PROCEDURE — 83880 ASSAY OF NATRIURETIC PEPTIDE: CPT | Performed by: EMERGENCY MEDICINE

## 2021-02-04 PROCEDURE — 96374 THER/PROPH/DIAG INJ IV PUSH: CPT

## 2021-02-04 PROCEDURE — 85379 FIBRIN DEGRADATION QUANT: CPT | Performed by: EMERGENCY MEDICINE

## 2021-02-04 PROCEDURE — 99285 EMERGENCY DEPT VISIT HI MDM: CPT | Performed by: EMERGENCY MEDICINE

## 2021-02-04 RX ORDER — LORAZEPAM 2 MG/ML
0.5 INJECTION INTRAMUSCULAR ONCE
Status: COMPLETED | OUTPATIENT
Start: 2021-02-04 | End: 2021-02-04

## 2021-02-04 RX ADMIN — LORAZEPAM 0.5 MG: 2 INJECTION INTRAMUSCULAR; INTRAVENOUS at 11:07

## 2021-02-04 RX ADMIN — SODIUM CHLORIDE 1000 ML: 0.9 INJECTION, SOLUTION INTRAVENOUS at 11:01

## 2021-02-04 RX ADMIN — IOHEXOL 85 ML: 350 INJECTION, SOLUTION INTRAVENOUS at 12:20

## 2021-02-04 NOTE — ED PROVIDER NOTES
History  Chief Complaint   Patient presents with    Chest Pain     Pt reports center chest pain for the past 1 5 weeks, was placed on a halter monitor for a week and returned it yesterday  Called PCP regarding chest pain today and told to come to ED     Patient complains of parasternal sharp chest pain that has been fairly constant for the past week and a half  Now rating to the left arm for the past 1 day constantly  Worse with exertion  Better with resting  Feels anxious  No recent cough or cold symptoms  Was COVID positive in December  No recent cough or cold symptoms  No fevers or chills  Does not smoke  Family history of heart disease  Sent in by family doctor because of continued pain  No history of DVT or PE  History provided by:  Patient   used: No    Chest Pain  Pain location:  Substernal area  Pain quality: sharp    Pain radiates to:  L arm  Pain radiates to the back: no    Pain severity:  Mild  Onset quality:  Gradual  Duration:  2 weeks  Timing:  Constant  Progression:  Unchanged  Chronicity:  New  Context: no movement and no stress    Relieved by:  Rest  Worsened by:  Exertion  Associated symptoms: no abdominal pain, no altered mental status, no anorexia, no back pain, no cough, no diaphoresis, no dizziness, no fatigue, no headache, no nausea, no palpitations, no PND and no shortness of breath    Risk factors: obesity        Prior to Admission Medications   Prescriptions Last Dose Informant Patient Reported?  Taking?   busPIRone (BUSPAR) 10 mg tablet   Yes No   Sig: Take 10 mg by mouth 2 (two) times a day   celecoxib (CeleBREX) 200 mg capsule   No No   Sig: Take 1 capsule (200 mg total) by mouth 2 (two) times a day   Patient not taking: Reported on 1/20/2021   gabapentin (NEURONTIN) 100 mg capsule   No No   Sig: Take 1 capsule (100 mg total) by mouth 3 (three) times a day      Facility-Administered Medications: None       Past Medical History:   Diagnosis Date    Arthritis     Asthma     Depression     Thyroid disease     Uterine cancer [facility])        Past Surgical History:   Procedure Laterality Date    HYSTERECTOMY  09/2019    KNEE SURGERY         Family History   Problem Relation Age of Onset    Lung cancer Mother     Heart attack Father     Diabetes Father     Diabetes Maternal Grandmother      I have reviewed and agree with the history as documented  E-Cigarette/Vaping    E-Cigarette Use Never User      E-Cigarette/Vaping Substances     Social History     Tobacco Use    Smoking status: Never Smoker    Smokeless tobacco: Never Used   Substance Use Topics    Alcohol use: Never     Frequency: Never    Drug use: Never       Review of Systems   Constitutional: Negative for chills, diaphoresis and fatigue  HENT: Negative for congestion, ear discharge, ear pain and sore throat  Eyes: Negative for pain, discharge and itching  Respiratory: Negative for cough, shortness of breath and wheezing  Cardiovascular: Positive for chest pain  Negative for palpitations, leg swelling and PND  Gastrointestinal: Negative for abdominal pain, anorexia, diarrhea and nausea  Endocrine: Negative for polydipsia and polyphagia  Genitourinary: Negative for dysuria and frequency  Musculoskeletal: Negative for back pain and myalgias  Skin: Negative for color change and rash  Neurological: Negative for dizziness, light-headedness and headaches  Hematological: Does not bruise/bleed easily  Psychiatric/Behavioral: Negative for agitation, behavioral problems and confusion  All other systems reviewed and are negative  Physical Exam  Physical Exam  Vitals signs and nursing note reviewed  Constitutional:       Appearance: She is well-developed  HENT:      Head: Normocephalic and atraumatic  Eyes:      Pupils: Pupils are equal, round, and reactive to light  Neck:      Musculoskeletal: Normal range of motion and neck supple        Trachea: No tracheal deviation  Cardiovascular:      Rate and Rhythm: Normal rate and regular rhythm  Heart sounds: Normal heart sounds  Pulmonary:      Effort: Pulmonary effort is normal       Breath sounds: Normal breath sounds  Abdominal:      General: Bowel sounds are normal  There is no distension  Palpations: Abdomen is soft  Tenderness: There is no abdominal tenderness  Musculoskeletal:         General: No tenderness or deformity  Skin:     General: Skin is warm and dry  Capillary Refill: Capillary refill takes less than 2 seconds  Neurological:      Mental Status: She is alert and oriented to person, place, and time  Cranial Nerves: No cranial nerve deficit  Psychiatric:      Comments: Very anxious  Vital Signs  ED Triage Vitals [02/04/21 1019]   Temperature Pulse Respirations Blood Pressure SpO2   97 5 °F (36 4 °C) 89 19 (!) 178/87 98 %      Temp Source Heart Rate Source Patient Position - Orthostatic VS BP Location FiO2 (%)   Temporal Monitor Lying Left arm --      Pain Score       4           Vitals:    02/04/21 1030 02/04/21 1045 02/04/21 1100 02/04/21 1200   BP: 124/71 124/78 121/79 127/77   Pulse: 83 79 81 81   Patient Position - Orthostatic VS:             Visual Acuity      ED Medications  Medications   sodium chloride 0 9 % bolus 1,000 mL (1,000 mL Intravenous New Bag 2/4/21 1101)   LORazepam (ATIVAN) injection 0 5 mg (0 5 mg Intravenous Given 2/4/21 1107)   iohexol (OMNIPAQUE) 350 MG/ML injection (MULTI-DOSE) 85 mL (85 mL Intravenous Given 2/4/21 1220)       Diagnostic Studies  Results Reviewed     Procedure Component Value Units Date/Time    Lactic acid [[id number]]  (Normal) Collected: 02/04/21 1046    Lab Status: Final result Specimen: Blood from Arm, Right Updated: 02/04/21 1120     LACTIC ACID 0 8 mmol/L     Narrative:      Result may be elevated if tourniquet was used during collection      Troponin I [[id number]]  (Normal) Collected: 02/04/21 1046    Lab Status: Final result Specimen: Blood from Arm, Right Updated: 02/04/21 1120     Troponin I <0 02 ng/mL     Lipase [[id number]]  (Normal) Collected: 02/04/21 1046    Lab Status: Final result Specimen: Blood from Arm, Right Updated: 02/04/21 1119     Lipase 224 u/L     NT-BNP PRO [[id number]]  (Normal) Collected: 02/04/21 1046    Lab Status: Final result Specimen: Blood from Arm, Right Updated: 02/04/21 1119     NT-proBNP 13 pg/mL     C-reactive protein [[id number]]  (Normal) Collected: 02/04/21 1046    Lab Status: Final result Specimen: Blood from Arm, Right Updated: 02/04/21 1119     CRP <0 5 mg/L     D-Dimer [[id number]]  (Abnormal) Collected: 02/04/21 1046    Lab Status: Final result Specimen: Blood from Arm, Right Updated: 02/04/21 1114     D-Dimer, Quant 0 50 ug/ml FEU     Comprehensive metabolic panel [[id number]]  (Abnormal) Collected: 02/04/21 1046    Lab Status: Final result Specimen: Blood from Arm, Right Updated: 02/04/21 1113     Sodium 141 mmol/L      Potassium 3 7 mmol/L      Chloride 103 mmol/L      CO2 28 mmol/L      ANION GAP 10 mmol/L      BUN 15 mg/dL      Creatinine 0 82 mg/dL      Glucose 149 mg/dL      Calcium 9 6 mg/dL      Corrected Calcium 10 1 mg/dL      AST 29 U/L      ALT 40 U/L      Alkaline Phosphatase 126 U/L      Total Protein 8 5 g/dL      Albumin 3 4 g/dL      Total Bilirubin 0 35 mg/dL      eGFR 85 ml/min/1 73sq m     Narrative:      [facility] guidelines for Chronic Kidney Disease (CKD):     Stage 1 with normal or high GFR (GFR > 90 mL/min/1 73 square meters)    Stage 2 Mild CKD (GFR = 60-89 mL/min/1 73 square meters)    Stage 3A Moderate CKD (GFR = 45-59 mL/min/1 73 square meters)    Stage 3B Moderate CKD (GFR = 30-44 mL/min/1 73 square meters)    Stage 4 Severe CKD (GFR = 15-29 mL/min/1 73 square meters)    Stage 5 End Stage CKD (GFR <15 mL/min/1 73 square meters)  Note: GFR calculation is accurate only with a steady state creatinine    Protime-INR [[id number]] (Normal) Collected: 02/04/21 1046    Lab Status: Final result Specimen: Blood from Arm, Right Updated: 02/04/21 1111     Protime 12 9 seconds      INR 0 99    APTT [[id number]]  (Normal) Collected: 02/04/21 1046    Lab Status: Final result Specimen: Blood from Arm, Right Updated: 02/04/21 1111     PTT 27 seconds     CBC and differential [[id number]]  (Abnormal) Collected: 02/04/21 1046    Lab Status: Final result Specimen: Blood from Arm, Right Updated: 02/04/21 1058     WBC 7 68 Thousand/uL      RBC 5 36 Million/uL      Hemoglobin 13 1 g/dL      Hematocrit 42 1 %      MCV 79 fL      MCH 24 4 pg      MCHC 31 1 g/dL      RDW 17 7 %      MPV 9 3 fL      Platelets 027 Thousands/uL      nRBC 0 /100 WBCs      Neutrophils Relative 56 %      Immat GRANS % 0 %      Lymphocytes Relative 34 %      Monocytes Relative 6 %      Eosinophils Relative 3 %      Basophils Relative 1 %      Neutrophils Absolute 4 34 Thousands/µL      Immature Grans Absolute 0 01 Thousand/uL      Lymphocytes Absolute 2 63 Thousands/µL      Monocytes Absolute 0 45 Thousand/µL      Eosinophils Absolute 0 19 Thousand/µL      Basophils Absolute 0 06 Thousands/µL                  CTA ED chest PE study   Final Result by Rock Lauro MD (02/04 1236)      No pulmonary embolus  No acute pulmonary disease  Workstation performed: [id number]         CT head without contrast   Final Result by Manoj Kirk MD (02/04 1236)      No acute intracranial abnormality                    Workstation performed: [id number]         XR chest 1 view portable    (Results Pending)              Procedures  ECG 12 Lead Documentation Only    Date/Time: 2/4/2021 10:27 AM  Performed by: Katherine Escamilla MD  Authorized by: Katherine Escamilla MD     ECG reviewed by me, the ED Provider: yes    Patient location:  ED  Previous ECG:     Previous ECG:  Compared to current    Similarity:  No change  Rate:     ECG rate:  90  Rhythm:     Rhythm: sinus rhythm    Ectopy:     Ectopy: none    QRS:     QRS axis:  Left             ED Course             HEART Risk Score      Most Recent Value   Heart Score Risk Calculator   History  0 Filed at: 02/04/2021 1057   ECG  0 Filed at: 02/04/2021 1057   Age  1 Filed at: 02/04/2021 1057   Risk Factors  1 Filed at: 02/04/2021 1057   Troponin  0 Filed at: 02/04/2021 1057   HEART Score  2 Filed at: 02/04/2021 1057                      SBIRT 20yo+      Most Recent Value   SBIRT (23 yo +)   In order to provide better care to our patients, we are screening all of our patients for alcohol and drug use  Would it be okay to ask you these screening questions? Yes Filed at: 02/04/2021 1107   Initial Alcohol Screen: US AUDIT-C    1  How often do you have a drink containing alcohol?  0 Filed at: 02/04/2021 1107   2  How many drinks containing alcohol do you have on a typical day you are drinking? 0 Filed at: 02/04/2021 1107   3b  FEMALE Any Age, or MALE 65+: How often do you have 4 or more drinks on one occassion? 0 Filed at: 02/04/2021 1107   Audit-C Score  0 Filed at: 02/04/2021 1107   FRANCA: How many times in the past year have you    Used an illegal drug or used a prescription medication for non-medical reasons? Never Filed at: 02/04/2021 1107                    MDM  Number of Diagnoses or Management Options     Amount and/or Complexity of Data Reviewed  Clinical lab tests: reviewed  Review and summarize past medical records: yes        Disposition  Final diagnoses:   Atypical chest pain   Anxiety     Time reflects when diagnosis was documented in both MDM as applicable and the Disposition within this note     Time User Action Codes Description Comment    2/4/2021 10:57 AM Ardine Keto Add [R07 89] Atypical chest pain     2/4/2021 12:47 PM Ardine Keto Add [F41 9] Anxiety       ED Disposition     ED Disposition Condition Date/Time Comment    Discharge Stable Thu Feb 4, 2021 10:57 AM Grace Mishra discharge to home/self care              Follow-up Information     Follow up With Specialties Details Why Contact Info    Malvin Verduzco MD Family Medicine Call in 2 days  [address]  [address] [facility] [address] [phone]      [facility], [address] Cardiology Call in 2 days  [facility] [address]  [address] [facility]  [phone]            Patient's Medications   Discharge Prescriptions    No medications on file         PDMP Review     None          ED Provider  Electronically Signed by           Amadou Wagoner MD  02/04/21 7010

## 2021-02-05 DIAGNOSIS — I10 ESSENTIAL (PRIMARY) HYPERTENSION: ICD-10-CM

## 2021-02-05 DIAGNOSIS — Z86.16 PERSONAL HISTORY OF COVID-19: ICD-10-CM

## 2021-02-05 DIAGNOSIS — R07.9 CHEST PAIN, UNSPECIFIED: ICD-10-CM

## 2021-02-07 LAB
ATRIAL RATE: 91 BPM
P AXIS: 66 DEGREES
PR INTERVAL: 136 MS
QRS AXIS: -44 DEGREES
QRSD INTERVAL: 86 MS
QT INTERVAL: 368 MS
QTC INTERVAL: 452 MS
T WAVE AXIS: 85 DEGREES
VENTRICULAR RATE: 91 BPM

## 2021-02-07 PROCEDURE — 93010 ELECTROCARDIOGRAM REPORT: CPT | Performed by: INTERNAL MEDICINE

## 2021-02-24 ENCOUNTER — HOSPITAL ENCOUNTER (OUTPATIENT)
Dept: NON INVASIVE DIAGNOSTICS | Facility: HOSPITAL | Age: 51
Discharge: HOME | End: 2021-02-24
Payer: COMMERCIAL

## 2021-02-24 DIAGNOSIS — Z86.16 PERSONAL HISTORY OF COVID-19: ICD-10-CM

## 2021-02-24 DIAGNOSIS — R07.9 CHEST PAIN, UNSPECIFIED: ICD-10-CM

## 2021-02-24 DIAGNOSIS — I10 ESSENTIAL (PRIMARY) HYPERTENSION: ICD-10-CM

## 2021-02-24 PROCEDURE — 93350 STRESS TTE ONLY: CPT

## 2021-02-24 RX ORDER — ATORVASTATIN CALCIUM 20 MG/1
TABLET, FILM COATED ORAL
COMMUNITY
Start: 2021-02-08

## 2021-02-24 RX ORDER — AMITRIPTYLINE HYDROCHLORIDE 50 MG/1
TABLET, FILM COATED ORAL
COMMUNITY
Start: 2021-02-08

## 2021-02-24 RX ORDER — LISINOPRIL 5 MG/1
TABLET ORAL
COMMUNITY
Start: 2021-01-26

## 2021-02-24 RX ORDER — ESCITALOPRAM OXALATE 10 MG/1
TABLET ORAL
COMMUNITY
Start: 2021-02-08

## 2021-04-15 ENCOUNTER — DOCTOR'S OFFICE (OUTPATIENT)
Dept: URBAN - NONMETROPOLITAN AREA CLINIC 1 | Facility: CLINIC | Age: 51
Setting detail: OPHTHALMOLOGY
End: 2021-04-15
Payer: COMMERCIAL

## 2021-04-15 VITALS — HEIGHT: 55 IN

## 2021-04-15 DIAGNOSIS — H53.19: ICD-10-CM

## 2021-04-15 DIAGNOSIS — H43.813: ICD-10-CM

## 2021-04-15 DIAGNOSIS — H40.013: ICD-10-CM

## 2021-04-15 DIAGNOSIS — H52.4: ICD-10-CM

## 2021-04-15 PROBLEM — Z01.00 GOOD OCULAR HEALTH OU: Status: RESOLVED | Noted: 2020-08-12 | Resolved: 2021-04-15

## 2021-04-15 PROBLEM — H52.03 HYPEROPIA; BOTH EYES: Status: RESOLVED | Noted: 2017-11-30 | Resolved: 2021-04-15

## 2021-04-15 PROCEDURE — 92014 COMPRE OPH EXAM EST PT 1/>: CPT | Performed by: OPTOMETRIST

## 2021-04-15 PROCEDURE — 92134 CPTRZ OPH DX IMG PST SGM RTA: CPT | Performed by: OPTOMETRIST

## 2021-04-15 PROCEDURE — 92015 DETERMINE REFRACTIVE STATE: CPT | Performed by: OPTOMETRIST

## 2021-04-15 ASSESSMENT — CONFRONTATIONAL VISUAL FIELD TEST (CVF)
OD_FINDINGS: FULL
OS_FINDINGS: FULL

## 2021-04-16 ENCOUNTER — OPTICAL OFFICE (OUTPATIENT)
Dept: URBAN - NONMETROPOLITAN AREA CLINIC 4 | Facility: CLINIC | Age: 51
Setting detail: OPHTHALMOLOGY
End: 2021-04-16
Payer: COMMERCIAL

## 2021-04-16 DIAGNOSIS — H52.223: ICD-10-CM

## 2021-04-16 PROCEDURE — V2784 LENS POLYCARB OR EQUAL: HCPCS | Performed by: OPTOMETRIST

## 2021-04-16 PROCEDURE — V2020 VISION SVCS FRAMES PURCHASES: HCPCS | Performed by: OPTOMETRIST

## 2021-04-16 PROCEDURE — V2202 LENS SPHERE BIFOCAL 7.12-20.: HCPCS | Performed by: OPTOMETRIST

## 2021-04-16 PROCEDURE — V2203 LENS SPHCYL BIFOCAL 4.00D/.1: HCPCS | Performed by: OPTOMETRIST

## 2021-04-16 ASSESSMENT — REFRACTION_MANIFEST
OD_VA1: 20/25
OD_AXIS: 160
OS_VA1: 20/40
OS_VA2: 20/20
OS_ADD: +2.00
OD_SPHERE: +1.00
OS_SPHERE: -0.25
OS_AXIS: 15
OD_VA2: 20/20
OS_VA1: 20/20
OU_VA: 20/20
OD_ADD: +2.25
OS_ADD: +2.25
OS_SPHERE: +0.75
OD_AXIS: 170
OD_CYLINDER: -0.75
OD_ADD: +2.00
OD_VA1: 20/20
OS_AXIS: 175
OD_CYLINDER: -0.50
OS_CYLINDER: -0.25
OD_SPHERE: -0.50
OS_CYLINDER: -1.00

## 2021-04-16 ASSESSMENT — SPHEQUIV_DERIVED
OS_SPHEQUIV: 0.625
OD_SPHEQUIV: -0.75
OS_SPHEQUIV: -0.75
OS_SPHEQUIV: -0.625
OD_SPHEQUIV: -0.75
OD_SPHEQUIV: 0.625

## 2021-04-16 ASSESSMENT — REFRACTION_AUTOREFRACTION
OS_CYLINDER: -1.25
OS_SPHERE: 0.00
OD_CYLINDER: -0.50
OD_SPHERE: -0.50
OS_AXIS: 175
OD_AXIS: 150

## 2021-04-16 ASSESSMENT — REFRACTION_CURRENTRX
OS_CYLINDER: -0.25
OS_ADD: +2.25
OD_AXIS: 174
OD_VPRISM_DIRECTION: BF
OS_OVR_VA: 20/
OD_SPHERE: +0.75
OD_ADD: +2.25
OD_CYLINDER: -0.75
OD_OVR_VA: 20/
OS_SPHERE: +0.75
OS_VPRISM_DIRECTION: BF
OS_AXIS: 1

## 2021-04-16 ASSESSMENT — VISUAL ACUITY
OD_BCVA: 20/150
OS_BCVA: 20/70

## 2021-06-30 ENCOUNTER — DOCTOR'S OFFICE (OUTPATIENT)
Dept: URBAN - NONMETROPOLITAN AREA CLINIC 1 | Facility: CLINIC | Age: 51
Setting detail: OPHTHALMOLOGY
End: 2021-06-30
Payer: COMMERCIAL

## 2021-06-30 DIAGNOSIS — H25.13: ICD-10-CM

## 2021-06-30 DIAGNOSIS — H40.033: ICD-10-CM

## 2021-06-30 DIAGNOSIS — E11.3293: ICD-10-CM

## 2021-06-30 PROCEDURE — 92134 CPTRZ OPH DX IMG PST SGM RTA: CPT | Performed by: OPHTHALMOLOGY

## 2021-06-30 PROCEDURE — 92014 COMPRE OPH EXAM EST PT 1/>: CPT | Performed by: OPHTHALMOLOGY

## 2021-06-30 PROCEDURE — 92020 GONIOSCOPY: CPT | Performed by: OPHTHALMOLOGY

## 2021-06-30 ASSESSMENT — VISUAL ACUITY
OS_BCVA: CFX4FT
OD_BCVA: CFX4FT

## 2021-06-30 ASSESSMENT — REFRACTION_MANIFEST
OS_VA2: 20/20
OS_ADD: +2.25
OD_SPHERE: +1.00
OD_CYLINDER: -0.50
OD_VA1: 20/20
OU_VA: 20/20
OS_CYLINDER: -1.00
OD_VA2: 20/20
OD_SPHERE: -0.50
OD_ADD: +2.25
OS_AXIS: 175
OS_VA1: 20/40
OS_SPHERE: +0.75
OD_VA1: 20/25
OS_CYLINDER: -0.25
OS_VA1: 20/20
OD_AXIS: 160
OS_AXIS: 15
OD_ADD: +2.00
OS_SPHERE: -0.25
OS_ADD: +2.00
OD_AXIS: 170
OD_CYLINDER: -0.75

## 2021-06-30 ASSESSMENT — REFRACTION_CURRENTRX
OD_CYLINDER: -0.50
OD_ADD: +2.25
OS_ADD: +2.25
OS_VPRISM_DIRECTION: BF
OD_AXIS: 168
OD_OVR_VA: 20/
OS_AXIS: 180
OS_OVR_VA: 20/
OS_CYLINDER: 0.00
OD_SPHERE: +0.75
OS_SPHERE: +1.00
OD_VPRISM_DIRECTION: BF

## 2021-06-30 ASSESSMENT — KERATOMETRY
OD_AXISANGLE_DEGREES: 170
OS_AXISANGLE_DEGREES: 175
OS_K1POWER_DIOPTERS: 43.50
OS_K2POWER_DIOPTERS: 43.50
OD_K1POWER_DIOPTERS: 43.25
OD_K2POWER_DIOPTERS: 43.25

## 2021-06-30 ASSESSMENT — AXIALLENGTH_DERIVED
OS_AL: 23.8878
OD_AL: 23.9823
OS_AL: 23.3509
OD_AL: 21.9551
OS_AL: 21.9178
OD_AL: 23.4412

## 2021-06-30 ASSESSMENT — SPHEQUIV_DERIVED
OS_SPHEQUIV: 0.625
OS_SPHEQUIV: -0.75
OD_SPHEQUIV: 4.75
OS_SPHEQUIV: 4.625
OD_SPHEQUIV: 0.625
OD_SPHEQUIV: -0.75

## 2021-06-30 ASSESSMENT — CONFRONTATIONAL VISUAL FIELD TEST (CVF)
OD_FINDINGS: FULL
OS_FINDINGS: FULL

## 2021-06-30 ASSESSMENT — REFRACTION_AUTOREFRACTION
OS_AXIS: 175
OS_CYLINDER: -0.25
OS_SPHERE: +4.75
OD_CYLINDER: -0.50
OD_AXIS: 160
OD_SPHERE: +5.00

## 2021-07-13 ENCOUNTER — AMBUL SURGICAL CARE (OUTPATIENT)
Dept: URBAN - NONMETROPOLITAN AREA SURGERY 1 | Facility: SURGERY | Age: 51
Setting detail: OPHTHALMOLOGY
End: 2021-07-13
Payer: COMMERCIAL

## 2021-07-13 DIAGNOSIS — H40.031: ICD-10-CM

## 2021-07-13 PROCEDURE — G8918 PT W/O PREOP ORDER IV AB PRO: HCPCS | Performed by: OPHTHALMOLOGY

## 2021-07-13 PROCEDURE — G8918 PT W/O PREOP ORDER IV AB PRO: HCPCS | Performed by: CLINIC/CENTER

## 2021-07-13 PROCEDURE — 66761 REVISION OF IRIS: CPT | Performed by: OPHTHALMOLOGY

## 2021-07-13 PROCEDURE — 66761 REVISION OF IRIS: CPT | Performed by: CLINIC/CENTER

## 2021-07-13 PROCEDURE — G8907 PT DOC NO EVENTS ON DISCHARG: HCPCS | Performed by: CLINIC/CENTER

## 2021-07-13 PROCEDURE — G8907 PT DOC NO EVENTS ON DISCHARG: HCPCS | Performed by: OPHTHALMOLOGY

## 2021-07-26 ENCOUNTER — AMBUL SURGICAL CARE (OUTPATIENT)
Dept: URBAN - NONMETROPOLITAN AREA SURGERY 1 | Facility: SURGERY | Age: 51
Setting detail: OPHTHALMOLOGY
End: 2021-07-26
Payer: COMMERCIAL

## 2021-07-26 DIAGNOSIS — H40.032: ICD-10-CM

## 2021-07-26 PROCEDURE — G8907 PT DOC NO EVENTS ON DISCHARG: HCPCS | Performed by: OPHTHALMOLOGY

## 2021-07-26 PROCEDURE — 66761 REVISION OF IRIS: CPT | Performed by: CLINIC/CENTER

## 2021-07-26 PROCEDURE — G8918 PT W/O PREOP ORDER IV AB PRO: HCPCS | Performed by: OPHTHALMOLOGY

## 2021-07-26 PROCEDURE — 66761 REVISION OF IRIS: CPT | Performed by: OPHTHALMOLOGY

## 2021-07-26 PROCEDURE — G8918 PT W/O PREOP ORDER IV AB PRO: HCPCS | Performed by: CLINIC/CENTER

## 2021-07-26 PROCEDURE — G8907 PT DOC NO EVENTS ON DISCHARG: HCPCS | Performed by: CLINIC/CENTER

## 2021-08-25 ENCOUNTER — DOCTOR'S OFFICE (OUTPATIENT)
Dept: URBAN - NONMETROPOLITAN AREA CLINIC 1 | Facility: CLINIC | Age: 51
Setting detail: OPHTHALMOLOGY
End: 2021-08-25
Payer: COMMERCIAL

## 2021-08-25 DIAGNOSIS — H40.033: ICD-10-CM

## 2021-08-25 PROCEDURE — 92012 INTRM OPH EXAM EST PATIENT: CPT | Performed by: OPHTHALMOLOGY

## 2021-08-25 ASSESSMENT — REFRACTION_MANIFEST
OD_CYLINDER: -0.75
OS_CYLINDER: -0.25
OS_AXIS: 15
OS_VA1: 20/40
OS_AXIS: 175
OD_VA2: 20/20
OS_ADD: +2.00
OS_VA2: 20/20
OS_ADD: +2.25
OD_VA1: 20/20
OD_CYLINDER: -0.50
OS_CYLINDER: -1.00
OU_VA: 20/20
OD_SPHERE: -0.50
OD_ADD: +2.25
OD_VA1: 20/25
OD_AXIS: 170
OD_ADD: +2.00
OS_SPHERE: -0.25
OD_AXIS: 160
OS_VA1: 20/20
OD_SPHERE: +1.00
OS_SPHERE: +0.75

## 2021-08-25 ASSESSMENT — VISUAL ACUITY
OS_BCVA: 20/40-2
OD_BCVA: 20/30-1

## 2021-08-25 ASSESSMENT — REFRACTION_AUTOREFRACTION
OS_SPHERE: +4.75
OD_CYLINDER: -0.50
OS_CYLINDER: -0.25
OD_SPHERE: +5.00
OD_AXIS: 160
OS_AXIS: 175

## 2021-08-25 ASSESSMENT — AXIALLENGTH_DERIVED
OD_AL: 21.9551
OD_AL: 23.4412
OS_AL: 23.8878
OS_AL: 21.9178
OD_AL: 23.9823
OS_AL: 23.3509

## 2021-08-25 ASSESSMENT — CONFRONTATIONAL VISUAL FIELD TEST (CVF)
OD_FINDINGS: FULL
OS_FINDINGS: FULL

## 2021-08-25 ASSESSMENT — REFRACTION_CURRENTRX
OS_VPRISM_DIRECTION: BF
OS_OVR_VA: 20/
OS_CYLINDER: 0.00
OD_ADD: +2.25
OS_ADD: +2.25
OD_AXIS: 168
OS_SPHERE: +1.00
OS_AXIS: 180
OD_VPRISM_DIRECTION: BF
OD_OVR_VA: 20/
OD_SPHERE: +0.75
OD_CYLINDER: -0.50

## 2021-08-25 ASSESSMENT — KERATOMETRY
OD_AXISANGLE_DEGREES: 170
OS_AXISANGLE_DEGREES: 175
OD_K1POWER_DIOPTERS: 43.25
OD_K2POWER_DIOPTERS: 43.25
OS_K1POWER_DIOPTERS: 43.50
OS_K2POWER_DIOPTERS: 43.50

## 2021-08-25 ASSESSMENT — SPHEQUIV_DERIVED
OD_SPHEQUIV: -0.75
OS_SPHEQUIV: 4.625
OD_SPHEQUIV: 4.75
OD_SPHEQUIV: 0.625
OS_SPHEQUIV: -0.75
OS_SPHEQUIV: 0.625

## 2021-09-22 ENCOUNTER — DOCTOR'S OFFICE (OUTPATIENT)
Dept: URBAN - NONMETROPOLITAN AREA CLINIC 1 | Facility: CLINIC | Age: 51
Setting detail: OPHTHALMOLOGY
End: 2021-09-22
Payer: COMMERCIAL

## 2021-09-22 ENCOUNTER — OPTICAL OFFICE (OUTPATIENT)
Dept: URBAN - NONMETROPOLITAN AREA CLINIC 4 | Facility: CLINIC | Age: 51
Setting detail: OPHTHALMOLOGY
End: 2021-09-22
Payer: COMMERCIAL

## 2021-09-22 DIAGNOSIS — H52.223: ICD-10-CM

## 2021-09-22 DIAGNOSIS — H52.4: ICD-10-CM

## 2021-09-22 PROCEDURE — V2202 LENS SPHERE BIFOCAL 7.12-20.: HCPCS | Performed by: OPTOMETRIST

## 2021-09-22 PROCEDURE — V2020 VISION SVCS FRAMES PURCHASES: HCPCS | Performed by: OPTOMETRIST

## 2021-09-22 PROCEDURE — V2784 LENS POLYCARB OR EQUAL: HCPCS | Performed by: OPTOMETRIST

## 2021-09-22 PROCEDURE — 92015 DETERMINE REFRACTIVE STATE: CPT | Performed by: OPTOMETRIST

## 2021-09-22 PROCEDURE — V2203 LENS SPHCYL BIFOCAL 4.00D/.1: HCPCS | Performed by: OPTOMETRIST

## 2021-09-22 ASSESSMENT — SPHEQUIV_DERIVED
OS_SPHEQUIV: 0.625
OS_SPHEQUIV: 0.875
OD_SPHEQUIV: 0.75
OD_SPHEQUIV: 0.75
OS_SPHEQUIV: 0.875
OD_SPHEQUIV: 0.625

## 2021-09-22 ASSESSMENT — REFRACTION_AUTOREFRACTION
OS_AXIS: 031
OS_CYLINDER: -0.25
OS_SPHERE: +1.00
OD_CYLINDER: -0.50
OD_AXIS: 148
OD_SPHERE: +1.00

## 2021-09-22 ASSESSMENT — AXIALLENGTH_DERIVED
OS_AL: 23.3509
OD_AL: 23.4412
OD_AL: 23.3932
OS_AL: 23.2559
OS_AL: 23.2559
OD_AL: 23.3932

## 2021-09-22 ASSESSMENT — REFRACTION_MANIFEST
OD_ADD: +2.00
OU_VA: 20/20
OD_VA1: 20/25+2
OD_VA1: 20/20
OD_SPHERE: +1.00
OD_VA2: 20/20
OS_AXIS: 15
OD_AXIS: 150
OD_SPHERE: +1.00
OS_SPHERE: +1.00
OS_CYLINDER: -0.25
OS_SPHERE: +0.75
OD_AXIS: 170
OD_CYLINDER: -0.50
OD_ADD: +2.25
OS_VA1: 20/25-2
OD_CYLINDER: -0.75
OS_CYLINDER: -0.25
OS_AXIS: 035
OS_VA2: 20/20
OS_VA1: 20/20
OS_ADD: +2.25
OS_ADD: +2.00

## 2021-09-22 ASSESSMENT — KERATOMETRY
OD_K2POWER_DIOPTERS: 43.25
OD_AXISANGLE_DEGREES: 170
OS_K2POWER_DIOPTERS: 43.50
OS_AXISANGLE_DEGREES: 175
OD_K1POWER_DIOPTERS: 43.25
OS_K1POWER_DIOPTERS: 43.50

## 2021-09-22 ASSESSMENT — REFRACTION_CURRENTRX
OS_SPHERE: +0.75
OD_ADD: +2.25
OS_CYLINDER: 0.00
OS_OVR_VA: 20/
OS_ADD: +2.25
OS_AXIS: 180
OD_CYLINDER: -0.50
OD_AXIS: 171
OD_VPRISM_DIRECTION: BF
OS_VPRISM_DIRECTION: BF
OD_OVR_VA: 20/
OD_SPHERE: +0.75

## 2021-09-22 ASSESSMENT — VISUAL ACUITY
OS_BCVA: 20/20-1
OD_BCVA: 20/25+1

## 2021-12-20 ENCOUNTER — DOCTOR'S OFFICE (OUTPATIENT)
Dept: URBAN - NONMETROPOLITAN AREA CLINIC 1 | Facility: CLINIC | Age: 51
Setting detail: OPHTHALMOLOGY
End: 2021-12-20
Payer: COMMERCIAL

## 2021-12-20 DIAGNOSIS — E11.9: ICD-10-CM

## 2021-12-20 DIAGNOSIS — H43.813: ICD-10-CM

## 2021-12-20 DIAGNOSIS — H40.033: ICD-10-CM

## 2021-12-20 DIAGNOSIS — H25.13: ICD-10-CM

## 2021-12-20 PROBLEM — H52.4 PRESBYOPIA ; BOTH EYES: Status: ACTIVE | Noted: 2019-06-06

## 2021-12-20 PROBLEM — H25.11 CATARACT NUCLEAR SCLEROSIS; RIGHT EYE, LEFT EYE: Status: ACTIVE | Noted: 2021-06-30

## 2021-12-20 PROBLEM — H40.032: Status: ACTIVE | Noted: 2021-06-30

## 2021-12-20 PROBLEM — H25.12 CATARACT NUCLEAR SCLEROSIS; RIGHT EYE, LEFT EYE: Status: ACTIVE | Noted: 2021-06-30

## 2021-12-20 PROBLEM — H40.031: Status: ACTIVE | Noted: 2021-06-30

## 2021-12-20 PROBLEM — H40.013 GLAUCOMA SUSPECT, LOW RISK; BOTH EYES: Status: ACTIVE | Noted: 2021-04-15

## 2021-12-20 PROCEDURE — 92134 CPTRZ OPH DX IMG PST SGM RTA: CPT | Performed by: OPHTHALMOLOGY

## 2021-12-20 PROCEDURE — 99214 OFFICE O/P EST MOD 30 MIN: CPT | Performed by: OPHTHALMOLOGY

## 2021-12-20 ASSESSMENT — SPHEQUIV_DERIVED
OS_SPHEQUIV: 0.625
OD_SPHEQUIV: 0.75
OD_SPHEQUIV: 0.625
OD_SPHEQUIV: 0.75
OS_SPHEQUIV: 0.875
OS_SPHEQUIV: 0.875

## 2021-12-20 ASSESSMENT — REFRACTION_MANIFEST
OS_CYLINDER: -0.25
OD_VA2: 20/20
OS_VA2: 20/20
OU_VA: 20/20
OS_ADD: +2.00
OS_VA1: 20/20
OD_ADD: +2.25
OD_SPHERE: +1.00
OD_CYLINDER: -0.50
OS_SPHERE: +0.75
OS_VA1: 20/25-2
OS_ADD: +2.25
OS_AXIS: 15
OS_CYLINDER: -0.25
OD_VA1: 20/25+2
OD_CYLINDER: -0.75
OD_SPHERE: +1.00
OS_SPHERE: +1.00
OD_ADD: +2.00
OD_AXIS: 150
OS_AXIS: 035
OD_VA1: 20/20
OD_AXIS: 170

## 2021-12-20 ASSESSMENT — REFRACTION_CURRENTRX
OD_OVR_VA: 20/
OS_SPHERE: +0.75
OS_VPRISM_DIRECTION: BF
OD_SPHERE: +0.75
OD_CYLINDER: -0.50
OD_AXIS: 171
OS_CYLINDER: 0.00
OD_VPRISM_DIRECTION: BF
OS_ADD: +2.25
OD_ADD: +2.25
OS_AXIS: 180
OS_OVR_VA: 20/

## 2021-12-20 ASSESSMENT — VISUAL ACUITY
OD_BCVA: 20/20-1
OS_BCVA: 20/20

## 2021-12-20 ASSESSMENT — REFRACTION_AUTOREFRACTION
OS_AXIS: 031
OD_CYLINDER: -0.50
OS_SPHERE: +1.00
OD_SPHERE: +1.00
OS_CYLINDER: -0.25
OD_AXIS: 148

## 2021-12-20 ASSESSMENT — KERATOMETRY
OS_K2POWER_DIOPTERS: 43.50
OS_AXISANGLE_DEGREES: 175
OD_AXISANGLE_DEGREES: 170
OD_K2POWER_DIOPTERS: 43.25
OD_K1POWER_DIOPTERS: 43.25
OS_K1POWER_DIOPTERS: 43.50

## 2021-12-20 ASSESSMENT — CONFRONTATIONAL VISUAL FIELD TEST (CVF)
OS_FINDINGS: FULL
OD_FINDINGS: FULL

## 2021-12-20 ASSESSMENT — AXIALLENGTH_DERIVED
OS_AL: 23.3509
OS_AL: 23.2559
OD_AL: 23.3932
OS_AL: 23.2559
OD_AL: 23.3932
OD_AL: 23.4412